# Patient Record
Sex: FEMALE | Race: BLACK OR AFRICAN AMERICAN
[De-identification: names, ages, dates, MRNs, and addresses within clinical notes are randomized per-mention and may not be internally consistent; named-entity substitution may affect disease eponyms.]

---
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
## 2021-08-19 NOTE — RAD
CT abdomen and pelvis with contrast.

 

HISTORY: Abdominal pain

 

CT scan the abdomen pelvis was done using 75 mL Omnipaque 300 contrast. 

Comparison is made with a study from September 2018. Lung bases are clear.

There is no effusion. A liver lesion is not identified. Spleen and adrenal

glands are normal. Pancreatic duct is mildly dilated. Common duct upper 

normal in size. There is no intrahepatic bile duct dilatation. There is no

calcified gallstone. There is no mass or hydronephrosis in the kidneys. 

There is no small bowel obstruction. Appendix is normal and is retrocecal 

in location. Uterus is unremarkable. There are small follicles in both 

ovaries, follicle in the right ovary measures 2.3 cm. There is no free 

fluid in the pelvis. 

 

IMPRESSION:

1. Normal appendix.

2. No abdominal or pelvic mass noted.

3. Normal follicles noted in each ovary.

4. No abdominal or pelvic mass or other acute finding.

 

 

 

 

 

 

 

 

 

PQRS Compliance Statement:

 

One or more of the following individualized dose reduction techniques were

utilized for this examination:  

1. Automated exposure control  

2. Adjustment of the mA and/or kV according to patient size  

3. Use of iterative reconstruction technique

 

Electronically signed by: Reynaldo Platt MD (7/10/2020 1:11 PM) [facility] Reason for Call:  Form, our goal is to have forms completed with 72 hours, however, some forms may require a visit or additional information.    Type of letter, form or note:  medical    Who is the form from?: Home care    Where did the form come from: form was faxed in    What clinic location was the form placed at?: [facility] [phone]    Where the form was placed: Team B Box/Folder    What number is listed as a contact on the form?: [phone]       Additional comments: Please complete form and return to [phone]    Call taken on 8/19/2021 at 2:16 PM by Katya Pierre